# Patient Record
Sex: MALE | Race: BLACK OR AFRICAN AMERICAN | NOT HISPANIC OR LATINO
[De-identification: names, ages, dates, MRNs, and addresses within clinical notes are randomized per-mention and may not be internally consistent; named-entity substitution may affect disease eponyms.]

---

## 2018-07-12 ENCOUNTER — APPOINTMENT (OUTPATIENT)
Dept: HUMAN REPRODUCTION | Facility: CLINIC | Age: 43
End: 2018-07-12

## 2018-07-12 ENCOUNTER — APPOINTMENT (OUTPATIENT)
Dept: HUMAN REPRODUCTION | Facility: CLINIC | Age: 43
End: 2018-07-12
Payer: COMMERCIAL

## 2018-07-12 PROCEDURE — 89322 SEMEN ANAL STRICT CRITERIA: CPT

## 2018-07-12 PROCEDURE — 89343 STORAGE/YEAR SPERM/SEMEN: CPT

## 2018-07-12 PROCEDURE — 89325 SPERM ANTIBODY TEST: CPT

## 2018-07-12 PROCEDURE — 89259 CRYOPRESERVATION SPERM: CPT

## 2018-07-13 ENCOUNTER — APPOINTMENT (OUTPATIENT)
Dept: HUMAN REPRODUCTION | Facility: CLINIC | Age: 43
End: 2018-07-13

## 2018-07-13 PROCEDURE — 89261 SPERM ISOLATION COMPLEX: CPT

## 2019-12-16 ENCOUNTER — APPOINTMENT (OUTPATIENT)
Dept: HUMAN REPRODUCTION | Facility: CLINIC | Age: 44
End: 2019-12-16

## 2020-04-25 ENCOUNTER — MESSAGE (OUTPATIENT)
Age: 45
End: 2020-04-25

## 2020-05-08 LAB
SARS-COV-2 IGG SERPL IA-ACNC: <0.1 INDEX
SARS-COV-2 IGG SERPL QL IA: NEGATIVE

## 2022-03-08 ENCOUNTER — EMERGENCY (EMERGENCY)
Facility: HOSPITAL | Age: 47
LOS: 1 days | Discharge: HOME | End: 2022-03-08
Attending: EMERGENCY MEDICINE
Payer: COMMERCIAL

## 2022-03-08 VITALS
HEART RATE: 60 BPM | SYSTOLIC BLOOD PRESSURE: 117 MMHG | DIASTOLIC BLOOD PRESSURE: 73 MMHG | OXYGEN SATURATION: 95 % | RESPIRATION RATE: 18 BRPM | TEMPERATURE: 98 F

## 2022-03-08 VITALS
RESPIRATION RATE: 16 BRPM | HEIGHT: 69 IN | OXYGEN SATURATION: 95 % | SYSTOLIC BLOOD PRESSURE: 112 MMHG | TEMPERATURE: 98 F | HEART RATE: 70 BPM | DIASTOLIC BLOOD PRESSURE: 73 MMHG | WEIGHT: 175.05 LBS

## 2022-03-08 PROCEDURE — 99284 EMERGENCY DEPT VISIT MOD MDM: CPT | Mod: 25

## 2022-03-08 PROCEDURE — 90715 TDAP VACCINE 7 YRS/> IM: CPT

## 2022-03-08 PROCEDURE — 90471 IMMUNIZATION ADMIN: CPT

## 2022-03-08 PROCEDURE — 99283 EMERGENCY DEPT VISIT LOW MDM: CPT

## 2022-03-08 RX ORDER — ACETAMINOPHEN 500 MG
975 TABLET ORAL ONCE
Refills: 0 | Status: COMPLETED | OUTPATIENT
Start: 2022-03-08 | End: 2022-03-08

## 2022-03-08 RX ORDER — TETANUS TOXOID, REDUCED DIPHTHERIA TOXOID AND ACELLULAR PERTUSSIS VACCINE, ADSORBED 5; 2.5; 8; 8; 2.5 [IU]/.5ML; [IU]/.5ML; UG/.5ML; UG/.5ML; UG/.5ML
0.5 SUSPENSION INTRAMUSCULAR ONCE
Refills: 0 | Status: COMPLETED | OUTPATIENT
Start: 2022-03-08 | End: 2022-03-08

## 2022-03-08 RX ADMIN — TETANUS TOXOID, REDUCED DIPHTHERIA TOXOID AND ACELLULAR PERTUSSIS VACCINE, ADSORBED 0.5 MILLILITER(S): 5; 2.5; 8; 8; 2.5 SUSPENSION INTRAMUSCULAR at 15:34

## 2022-03-08 RX ADMIN — Medication 975 MILLIGRAM(S): at 15:34

## 2022-03-08 NOTE — ED PROVIDER NOTE - OBJECTIVE STATEMENT
45 yo male  brought into the ER for evaluation of facial and head injury.  Pt awake, alert oriented x3 states "I was walking and my glasses fogged up and I thought I was opening up a door and I actually walked into the door and hit my head and my glasses.  My glasses pushed into my face and scratched my nose.  I think I bumped my lip as well because it feels swollen.  After I walked into the door I fell to the ground but didn't pass out".   Denies LOC and dizziness.  PT with abrasion to bridge of nose. Head intact with no hematoma or contusion.

## 2022-03-08 NOTE — ED PROVIDER NOTE - CONSTITUTIONAL, MLM
Administered By (Optional): MARINA normal... Well appearing, awake, alert, oriented to person, place, time/situation and in no apparent distress.

## 2022-03-08 NOTE — ED PROVIDER NOTE - NSFOLLOWUPINSTRUCTIONS_ED_ALL_ED_FT
YOU WERE SEEN IN THE ED FOR: facial trauma.  As discussed, after head trauma, sometimes people develop concussions.  You are being given the concussion clinic number in the event that you develop symptoms and need follow up.      FOR PAIN, YOU MAY TAKE TYLENOL (Acetaminophen). FOLLOW THE INSTRUCTIONS ON THE LABEL/CONTAINER.    PLEASE FOLLOW UP WITH YOUR PRIVATE PHYSICIAN WITHIN THE NEXT 48 HOURS. BRING COPIES OF YOUR RESULTS.      RETURN TO THE EMERGENCY DEPARTMENT IF YOU EXPERIENCE ANY NEW/CONCERNING/WORSENING SYMPTOMS SUCH AS BUT NOT LIMITED TO: change in vision, double vision, sudden loss of vision, severe headache, nose bleed that will not stop with holding pressure.

## 2022-03-08 NOTE — ED PROVIDER NOTE - ATTENDING CONTRIBUTION TO CARE
Attending MD Nunes: I personally have seen and examined this patient.  NP note reviewed and agree on plan of care and except where noted.  See below for details.     Seen in [place] [facility]    46M with no reported PMH/PSH/Meds, no known drug allergies presents to the ED s/p facial trauma.  Reports that his glasses fogged up and walked into a glass door.  Reports fell to ground and was helped to lean against wall by stranger. Denies preceding dizziness, weakness, sensory changes.  Denies LOC.  Denies change in vision, double vision, sudden loss of vision. Denies neck pain.  Reports has pain at nose, reports small cut to the R side of nose.  Denies change in hearing.      Exam:   General: NAD, GCS 15  HENT: head NCAT, airway patent, +minimal tenderness to palpation at bony bridge of nose, no gross deformity, no gross deviation of septum, no dried blood at nares, no blood in oropharynx, no periorbital or posterior auricular ecchymosis  Eyes: PERRL, EOMI, no conjunctival injection   Chest: symmetric chest rise, no increased work of breathing  MSK: ranging neck and extremities freely, no tenderness to midline palpation  Neuro: moving all extremities spontaneously with 5/5 strength, sensory grossly intact, no gross neuro deficits  Psych: normal mood and affect   Skin: 2 mm abrasion to the R side of nose at place where glasses rest on nose    A/P: 46M with facial trauma, [place] head CT negative, no indication for head CT at this time, no entrapment, explained even if he does have a nasal fracture no intervention at this time, extensive discussion with patient, explained possible concussion, stable for discharge. Follow up instructions given, importance of follow up emphasized, return to ED parameters reviewed and patient verbalized understanding.  All questions answered, all concerns addressed.

## 2022-03-08 NOTE — ED PROVIDER NOTE - CLINICAL SUMMARY MEDICAL DECISION MAKING FREE TEXT BOX
s/p walking into door- facial injurues and hit head, no loc,  not dizzy  mild headache- tdap not utd,  tylenol and tdap  no imaging dc home

## 2022-03-08 NOTE — ED PROVIDER NOTE - PATIENT PORTAL LINK FT
You can access the FollowMyHealth Patient Portal offered by [facility] by registering at the following website: [email or web address]. By joining [employer]’s FollowMyHealth portal, you will also be able to view your health information using other applications (apps) compatible with our system.

## 2022-03-12 ENCOUNTER — EMERGENCY (EMERGENCY)
Facility: HOSPITAL | Age: 47
LOS: 1 days | Discharge: HOME | End: 2022-03-12
Attending: EMERGENCY MEDICINE | Admitting: EMERGENCY MEDICINE
Payer: COMMERCIAL

## 2022-03-12 VITALS
OXYGEN SATURATION: 100 % | HEART RATE: 67 BPM | SYSTOLIC BLOOD PRESSURE: 135 MMHG | HEIGHT: 69 IN | DIASTOLIC BLOOD PRESSURE: 81 MMHG | TEMPERATURE: 98 F | RESPIRATION RATE: 16 BRPM

## 2022-03-12 PROCEDURE — 99285 EMERGENCY DEPT VISIT HI MDM: CPT

## 2022-03-12 RX ORDER — ACETAMINOPHEN 500 MG
650 TABLET ORAL ONCE
Refills: 0 | Status: COMPLETED | OUTPATIENT
Start: 2022-03-12 | End: 2022-03-12

## 2022-03-12 RX ADMIN — Medication 650 MILLIGRAM(S): at 23:30

## 2022-03-12 NOTE — ED PROVIDER NOTE - NSFOLLOWUPINSTRUCTIONS_ED_ALL_ED_FT
You were seen in the Emergency Room for headache after head trauma and evaluated for any life-threatening conditions.     After our evaluation, we have determined you do not have a life-threatening condition today and you can be discharged home.     YOU DO NOT HAVE ANY BONE FRACTURES (BROKEN BONES) IN YOUR FACE.  Your CT scan results are attached.    Please return to the Emergency Room if your symptoms change or worsen.    Please follow up with a general medicine doctor (PMD) routinely as needed.     If you need help making an appointment with a doctor or do not have your own PMD, you can call our referral line at [phone] to make an appointment with a general medicine doctor (PMD).

## 2022-03-12 NOTE — ED ADULT NURSE NOTE - OBJECTIVE STATEMENT
Patient came in with the complaints of Head pain. As per patient he walked into the door last week and he hit his head and was seen in [facility] with negative work up. Patient stated whenever he blow his nose he saw blood in it and his head hurts. No other complaints. No distress noted. Nursing care continues

## 2022-03-12 NOTE — ED PROVIDER NOTE - PROGRESS NOTE DETAILS
Dr. Eladio Crow DO (ED ATTENDING):  CT results discussed with patient. no fracture All test results have been reviewed with the patient with verbalized understanding.  Opportunities to ask questions for further understanding have been offered to the patient.  The patient feels comfortable going home after our evaluation and understands to return to the Emergency Department for any new or worsening symptoms.

## 2022-03-12 NOTE — ED PROVIDER NOTE - CLINICAL SUMMARY MEDICAL DECISION MAKING FREE TEXT BOX
46 year old with head trauma. seen at other ed and dc'd without imaging. continues to have headache. will ct head and face for ich subdural skull fx.  will rule out nasal fx pain control

## 2022-03-12 NOTE — ED ADULT TRIAGE NOTE - CHIEF COMPLAINT QUOTE
Patient c/o head/nose pain. Pt walked into a door last week after his glasses fogged up, hitting his face and nose. Was seen at [facility] with negative workup, told to return if pain worsened. Denies weakness/dizziness. Denies changes in vision. States "when I blow my nose sometimes I see blood."

## 2022-03-12 NOTE — ED PROVIDER NOTE - PATIENT PORTAL LINK FT
You can access the FollowMyHealth Patient Portal offered by [facility] by registering at the following website: [email or web address]. By joining [employer]’s FollowMyHealth portal, you will also be able to view your health information using other applications (apps) compatible with our system.

## 2022-03-12 NOTE — ED ADULT NURSE NOTE - CHIEF COMPLAINT QUOTE
Patient c/o head/nose pain. Pt walked into a door last week after his glasses fogged up, hitting his face and nose. Was seen at [facility] with negative workup, told to return if pain worsened. Denies weakness/dizziness. Denies changes in vision. States "when I blow my nose sometimes I see blood."

## 2022-03-12 NOTE — ED PROVIDER NOTE - OBJECTIVE STATEMENT
This is a 46 yr old M, pmh glaucoma with c/o intermittent headache and nasal pain. Reports on Tuesday he walked into a glass door and hit his head. He was seen in the er, and discharge. Denies any loc, confusion, fever chills, n/v, vision changes, dizziness, light headedness, numbness, tingling, unsteady gait.

## 2022-03-13 PROCEDURE — G1004: CPT

## 2022-03-13 PROCEDURE — 70486 CT MAXILLOFACIAL W/O DYE: CPT | Mod: 26,MG

## 2022-03-13 PROCEDURE — 70450 CT HEAD/BRAIN W/O DYE: CPT | Mod: 26,MA

## 2022-11-23 ENCOUNTER — EMERGENCY (EMERGENCY)
Facility: HOSPITAL | Age: 47
LOS: 1 days | Discharge: HOME | End: 2022-11-23
Attending: EMERGENCY MEDICINE | Admitting: EMERGENCY MEDICINE

## 2022-11-23 VITALS
SYSTOLIC BLOOD PRESSURE: 125 MMHG | DIASTOLIC BLOOD PRESSURE: 45 MMHG | OXYGEN SATURATION: 100 % | HEART RATE: 62 BPM | TEMPERATURE: 98 F | RESPIRATION RATE: 16 BRPM

## 2022-11-23 VITALS — SYSTOLIC BLOOD PRESSURE: 114 MMHG | HEART RATE: 60 BPM | DIASTOLIC BLOOD PRESSURE: 82 MMHG

## 2022-11-23 LAB
ALBUMIN SERPL ELPH-MCNC: 4.5 G/DL — SIGNIFICANT CHANGE UP (ref 3.3–5)
ALP SERPL-CCNC: 55 U/L — SIGNIFICANT CHANGE UP (ref 40–120)
ALT FLD-CCNC: 26 U/L — SIGNIFICANT CHANGE UP (ref 4–41)
ANION GAP SERPL CALC-SCNC: 10 MMOL/L — SIGNIFICANT CHANGE UP (ref 7–14)
ANISOCYTOSIS BLD QL: SLIGHT — SIGNIFICANT CHANGE UP
AST SERPL-CCNC: 40 U/L — SIGNIFICANT CHANGE UP (ref 4–40)
BASOPHILS # BLD AUTO: 0 K/UL — SIGNIFICANT CHANGE UP (ref 0–0.2)
BASOPHILS NFR BLD AUTO: 0 % — SIGNIFICANT CHANGE UP (ref 0–2)
BILIRUB SERPL-MCNC: 0.7 MG/DL — SIGNIFICANT CHANGE UP (ref 0.2–1.2)
BUN SERPL-MCNC: 15 MG/DL — SIGNIFICANT CHANGE UP (ref 7–23)
CALCIUM SERPL-MCNC: 9.4 MG/DL — SIGNIFICANT CHANGE UP (ref 8.4–10.5)
CHLORIDE SERPL-SCNC: 104 MMOL/L — SIGNIFICANT CHANGE UP (ref 98–107)
CO2 SERPL-SCNC: 24 MMOL/L — SIGNIFICANT CHANGE UP (ref 22–31)
CREAT SERPL-MCNC: 0.88 MG/DL — SIGNIFICANT CHANGE UP (ref 0.5–1.3)
EGFR: 106 ML/MIN/1.73M2 — SIGNIFICANT CHANGE UP
EOSINOPHIL # BLD AUTO: 0.03 K/UL — SIGNIFICANT CHANGE UP (ref 0–0.5)
EOSINOPHIL NFR BLD AUTO: 0.9 % — SIGNIFICANT CHANGE UP (ref 0–6)
GIANT PLATELETS BLD QL SMEAR: PRESENT — SIGNIFICANT CHANGE UP
GLUCOSE SERPL-MCNC: 98 MG/DL — SIGNIFICANT CHANGE UP (ref 70–99)
HCT VFR BLD CALC: 45.4 % — SIGNIFICANT CHANGE UP (ref 39–50)
HGB BLD-MCNC: 15.1 G/DL — SIGNIFICANT CHANGE UP (ref 13–17)
IANC: 1.29 K/UL — LOW (ref 1.8–7.4)
LYMPHOCYTES # BLD AUTO: 1.23 K/UL — SIGNIFICANT CHANGE UP (ref 1–3.3)
LYMPHOCYTES # BLD AUTO: 37.4 % — SIGNIFICANT CHANGE UP (ref 13–44)
MACROCYTES BLD QL: SLIGHT — SIGNIFICANT CHANGE UP
MCHC RBC-ENTMCNC: 30.8 PG — SIGNIFICANT CHANGE UP (ref 27–34)
MCHC RBC-ENTMCNC: 33.3 GM/DL — SIGNIFICANT CHANGE UP (ref 32–36)
MCV RBC AUTO: 92.5 FL — SIGNIFICANT CHANGE UP (ref 80–100)
MONOCYTES # BLD AUTO: 0.29 K/UL — SIGNIFICANT CHANGE UP (ref 0–0.9)
MONOCYTES NFR BLD AUTO: 8.7 % — SIGNIFICANT CHANGE UP (ref 2–14)
NEUTROPHILS # BLD AUTO: 1.54 K/UL — LOW (ref 1.8–7.4)
NEUTROPHILS NFR BLD AUTO: 46.9 % — SIGNIFICANT CHANGE UP (ref 43–77)
PLAT MORPH BLD: NORMAL — SIGNIFICANT CHANGE UP
PLATELET # BLD AUTO: 170 K/UL — SIGNIFICANT CHANGE UP (ref 150–400)
PLATELET COUNT - ESTIMATE: NORMAL — SIGNIFICANT CHANGE UP
POLYCHROMASIA BLD QL SMEAR: SLIGHT — SIGNIFICANT CHANGE UP
POTASSIUM SERPL-MCNC: 5.4 MMOL/L — HIGH (ref 3.5–5.3)
POTASSIUM SERPL-SCNC: 5.4 MMOL/L — HIGH (ref 3.5–5.3)
PROT SERPL-MCNC: 7.3 G/DL — SIGNIFICANT CHANGE UP (ref 6–8.3)
RBC # BLD: 4.91 M/UL — SIGNIFICANT CHANGE UP (ref 4.2–5.8)
RBC # FLD: 11.6 % — SIGNIFICANT CHANGE UP (ref 10.3–14.5)
RBC BLD AUTO: ABNORMAL
SODIUM SERPL-SCNC: 138 MMOL/L — SIGNIFICANT CHANGE UP (ref 135–145)
TROPONIN T, HIGH SENSITIVITY RESULT: <6 NG/L — SIGNIFICANT CHANGE UP
VARIANT LYMPHS # BLD: 6.1 % — HIGH (ref 0–6)
WBC # BLD: 3.28 K/UL — LOW (ref 3.8–10.5)
WBC # FLD AUTO: 3.28 K/UL — LOW (ref 3.8–10.5)

## 2022-11-23 PROCEDURE — 99285 EMERGENCY DEPT VISIT HI MDM: CPT

## 2022-11-23 PROCEDURE — 93010 ELECTROCARDIOGRAM REPORT: CPT

## 2022-11-23 PROCEDURE — 71046 X-RAY EXAM CHEST 2 VIEWS: CPT | Mod: 26

## 2022-11-23 RX ORDER — KETOROLAC TROMETHAMINE 30 MG/ML
15 SYRINGE (ML) INJECTION ONCE
Refills: 0 | Status: DISCONTINUED | OUTPATIENT
Start: 2022-11-23 | End: 2022-11-23

## 2022-11-23 RX ADMIN — Medication 15 MILLIGRAM(S): at 16:18

## 2022-11-23 NOTE — ED PROVIDER NOTE - PROGRESS NOTE DETAILS
Zambratto, Med Tox Fellow: trop <6 with 3 days of atypical sxs, cxr reassuring, pain improves after nsaids. Likely msk. Don't think pt needs to stay for provocative testing. Will give outpt cards f/u. TBDC

## 2022-11-23 NOTE — ED PROVIDER NOTE - PATIENT PORTAL LINK FT
You can access the FollowMyHealth Patient Portal offered by [facility] by registering at the following website: [email or web address]. By joining [employer]’s FollowMyHealth portal, you will also be able to view your health information using other applications (apps) compatible with our system.

## 2022-11-23 NOTE — ED ADULT NURSE NOTE - OBJECTIVE STATEMENT
Pt received to intake presents with cp. pt a&ox4, ambulatory at baseline, denies sob, dizziness, fever, chills. reports chest pain has been ongoing x 4 days. pt nsr on CM, VSS, 20G IV placed in left arm, labs drawn and sent, medicated as per ordered. will continue to monitor.

## 2022-11-23 NOTE — ED PROVIDER NOTE - OBJECTIVE STATEMENT
47-year-old male with no significant past medical history, never smoker presents to the ED with left-sided chest pain that is worse with movement patient also states that is worse when he pushes his head down.  Denies any association with exertion, diaphoresis, nausea, shortness of breath, lightheadedness, palpitations.  Denies fevers chills, cough, abdominal pain, vomiting, diarrhea, dark or bloody stools, urinary symptoms, leg swelling.  Patient has not had provocative cardiac testing in the past.

## 2022-11-23 NOTE — ED PROVIDER NOTE - PHYSICAL EXAMINATION
GENERAL: no acute distress, non-toxic appearing  HEENT: normal conjunctiva, oral mucosa moist  CARDIAC: regular rate and regular rhythm, bp reassuring, 2+ equal pulses all extremities  PULM: clear to ascultation bilaterally, no incr wob  GI: abdomen nondistended, soft, nontender  : no suprapubic tenderness  NEURO: alert and oriented x 3, normal speech, moving all extremities without lateralization  MSK: no visible deformities, no peripheral edema, calf tenderness/redness/swelling  SKIN: no visible rashes  PSYCH: appropriate mood and affect

## 2022-11-23 NOTE — ED PROVIDER NOTE - CLINICAL SUMMARY MEDICAL DECISION MAKING FREE TEXT BOX
Zambratto, Med Tox Fellow: Low suspicion ACS given risk factors and atypical presentation.  Low suspicion PE patient percs out.  Low suspicion pneumothorax or infectious etiology.  Plan: Labs, troponin, chest x-ray, EKG.

## 2022-11-23 NOTE — ED PROVIDER NOTE - NSFOLLOWUPINSTRUCTIONS_ED_ALL_ED_FT
- Please follow up with your Primary Care Doctor within 1 week. Bring your results from today.    - Please follow up with Cardiologist at:  [facility]  Phone: [phone]    - Tylenol up to 650 mg every 6 hours as needed for pain and/or Ibuprofen up to 400 mg every 6 hours as needed for pain.    - Be sure to return to the ED if you develop new, worsening, or any distressing symptoms.

## 2022-11-23 NOTE — ED PROVIDER NOTE - ATTENDING CONTRIBUTION TO CARE
Attending note:   After face to face evaluation of this patient, I concur with above noted hx, pe, and care plan for this patient.  [place]: 47-year-old male with past medical history of glaucoma.  Patient comes in with 3 days of initially intermittent left-sided sharp chest pain radiating occasionally to his left back.  Patient notes that pain is sometimes worse with deep inspiration but not associated with shortness of breath, nausea, vomiting, sweating, or exertion.  Today at 12:00 while driving to work for his shift he noticed that the pain started and has not resolved.  Patient works a desk job.  Patient is a non-smoker nondrinker with no family history of heart disease.  Patient has a primary care doctor and has never seen a cardiologist.  Patient denies any fevers, chills, cough, travel, leg swelling, or similar previous episodes.  Patient did have some relief with Motrin.  On physical exam as patient vital signs are normal with blood pressure in the 120s over 40s, heart rate 60s, 100% on room air sat, respiratory rate 16.  Patient appears very comfortable.  There is no midline spinal tenderness.  Patient's lungs are clear.  Heart is regular rate and rhythm with no murmurs.  There is no areas of tenderness on chest wall.  Abdomen is soft and nontender.  There is no calf tenderness pulses are equal in all 4 extremities.  Patient's neuro exam is grossly normal.  Patient's EKG shows normal sinus rhythm with possibly peaked T's in V3 V4 and early repolarization.    Medical decision makin-year-old male with intermittent 3 days of chest pain that is atypical nonexertional with no significant risk factors.  EKG is nonspecific and patient has a heart score less than 3.  We will check labs including troponin, EKG, chest x-ray and trial NSAIDs.  All if no significant findings patient can follow-up with PMD and arrange outpatient stress.

## 2024-06-10 NOTE — ED PROVIDER NOTE - PRINCIPAL DIAGNOSIS
[Hyperlipidemia] : hyperlipidemia [Hypertension] : hypertension Abrasion of other part of head, initial encounter